# Patient Record
Sex: FEMALE | Race: WHITE
[De-identification: names, ages, dates, MRNs, and addresses within clinical notes are randomized per-mention and may not be internally consistent; named-entity substitution may affect disease eponyms.]

---

## 2017-09-05 ENCOUNTER — HOSPITAL ENCOUNTER (OUTPATIENT)
Dept: HOSPITAL 61 - PCVCCLINIC | Age: 67
Discharge: HOME | End: 2017-09-05
Attending: INTERNAL MEDICINE
Payer: MEDICARE

## 2017-09-05 DIAGNOSIS — E78.00: ICD-10-CM

## 2017-09-05 DIAGNOSIS — I25.10: Primary | ICD-10-CM

## 2017-09-05 DIAGNOSIS — Z96.653: ICD-10-CM

## 2017-09-05 DIAGNOSIS — Z87.891: ICD-10-CM

## 2017-09-05 DIAGNOSIS — I10: ICD-10-CM

## 2017-09-05 DIAGNOSIS — Z96.642: ICD-10-CM

## 2017-09-05 DIAGNOSIS — I45.89: ICD-10-CM

## 2017-09-05 DIAGNOSIS — Z90.49: ICD-10-CM

## 2017-09-05 DIAGNOSIS — M19.90: ICD-10-CM

## 2017-09-05 DIAGNOSIS — Z79.82: ICD-10-CM

## 2017-09-05 PROCEDURE — 36415 COLL VENOUS BLD VENIPUNCTURE: CPT

## 2017-09-05 PROCEDURE — G0463 HOSPITAL OUTPT CLINIC VISIT: HCPCS

## 2017-09-05 PROCEDURE — 93005 ELECTROCARDIOGRAM TRACING: CPT

## 2017-09-07 ENCOUNTER — HOSPITAL ENCOUNTER (OUTPATIENT)
Dept: HOSPITAL 61 - PCVCIMAG | Age: 67
Discharge: HOME | End: 2017-09-07
Attending: INTERNAL MEDICINE
Payer: MEDICARE

## 2017-09-07 DIAGNOSIS — Z01.810: Primary | ICD-10-CM

## 2017-09-07 DIAGNOSIS — E78.5: ICD-10-CM

## 2017-09-07 DIAGNOSIS — I10: ICD-10-CM

## 2017-09-07 DIAGNOSIS — I25.10: ICD-10-CM

## 2017-09-07 DIAGNOSIS — I07.1: ICD-10-CM

## 2017-09-07 PROCEDURE — 93306 TTE W/DOPPLER COMPLETE: CPT

## 2017-09-07 NOTE — PCVCIMAG
--------------- APPROVED REPORT --------------





Study performed:  2017 14:39:26



EXAM: Comprehensive 2D, Doppler, and color-flow 

Echocardiogram

Patient Location: Echo lab

Status:  routine



BSA:         2.17

HR: 70 bpmBP:          136/86 mmHg

Rhythm: NSR



Risk Factors: 

Cardiac Risk Factors:  HTN, 

Hyperlipidemia



Indications

Pre-Op

Dyspnea 

CAD



2D Dimensions

LVEF(%):  47.44 (&gt;50%)

IVSd:  7.23 (7-11mm)LVOT Diam:  25.64 (18-24mm) 

LVDd:  51.37 mm

PWd:  7.11 (7-11mm)Ascending Ao:  32.82 (22-36mm)

LVDs:  39.07 (25-40mm)

Left Atrium:  29.88 (27-40mm)

Aortic Root:  29.12 mm

LV Single Plane 4CH:  52.79 %

LV Single Plane 2CH:  53.63 %[facility]'s LVEF:  53.21 %

Biplane EF:  53.6 %



Volumes

Left Atrial Volume (Systole)

Single Plane 4CH:  27.33 mLSingle Plane 2CH:  48.50 mL

Biplane LA Volume:  38.00 mLLA ESV Index:  18.00 mL/m2



Aortic Valve

AoV Peak Scott.:  1.26 m/s

AO Peak Gr.:  6.36 mmHgLVOT Max P.18 mmHg

LVOT Max V:  1.14 m/s

BERTHA Vmax: 4.66 cm2



Mitral Valve

E/A Ratio:  0.7

MV Decel. Time:  205.53 ms

MV E Max Scott.:  0.54 m/s

MV A Scott.:  0.79 m/s

IVRT:  83.04 ms



TDI

E/Lateral E':  13.50E/Medial E':  6.00

Medial E' Scott.:  0.09 m/s

Lateral E' Scott.:  0.04 m/s



Pulmonary Valve

PV Peak Scott.:  0.72 m/sPV Peak Gr.:  2.09 mmHg



Pulmonary Vein

P Vein S:    0.50 m/sP Vein A:  0.30 m/s

P Vein D:   0.47 m/sP Vein A Dur.:  72.7 msec

P Vein S/D Ratio:  1.06



Tricuspid Valve

TR Peak Scott.:  1.22 m/s

TR Peak Gr.:  5.94 mmHg

TV Vmax:  0.49 m/sPA Pressure:  13.00 mmHg



Left Ventricle

The left ventricle is normal size. There is normal LV segmental wall 

motion. There is normal left ventricular wall thickness. Left 

ventricular systolic function is normal. The left ventricular 

ejection fraction is within the normal range. LVEF is 50-55%. Grade I 

- abnormal relaxation pattern.



Right Ventricle

The right ventricle is normal size. The right ventricular systolic 

function is normal.



Atria

The left atrium size is normal. The right atrium size is 

normal.



Aortic Valve

The aortic valve is normal in structure. No aortic regurgitation is 

present. There is no aortic valvular stenosis.



Mitral Valve

The mitral valve is normal in structure. There is no mitral valve 

regurgitation noted. No evidence of mitral valve stenosis.



Tricuspid Valve

The tricuspid valve is normal in structure. Trace tricuspid 

regurgitation with a PA pressure of 13 mmHg.



Pulmonic Valve

The pulmonary valve is normal in structure. There is no pulmonic 

valvular regurgitation.



Great Vessels

Aortic root is mildly dilated without aortic regurgitation. The 

ascending aorta is normal in size. IVC is normal in size and 

collapses &gt;50% with inspiration.



Pericardium

There is no pericardial effusion. There is no pleural 

effusion.



&lt;Conclusion&gt;

The left ventricle is normal size.

Left ventricular systolic function is normal.

Grade I - abnormal relaxation pattern.

The right ventricle is normal size.

The left atrium size is normal.

There is no aortic valvular stenosis.

The mitral valve is normal in structure.

Trace tricuspid regurgitation with a PA pressure of 13 mmHg.

## 2018-07-11 ENCOUNTER — HOSPITAL ENCOUNTER (OUTPATIENT)
Dept: HOSPITAL 61 - PCVCCLINIC | Age: 68
Discharge: HOME | End: 2018-07-11
Attending: INTERNAL MEDICINE
Payer: MEDICARE

## 2018-07-11 DIAGNOSIS — I25.10: Primary | ICD-10-CM

## 2018-07-11 DIAGNOSIS — I10: ICD-10-CM

## 2018-07-11 DIAGNOSIS — Z79.82: ICD-10-CM

## 2018-07-11 DIAGNOSIS — E78.00: ICD-10-CM

## 2018-07-11 DIAGNOSIS — Z87.891: ICD-10-CM

## 2018-07-11 DIAGNOSIS — Z79.899: ICD-10-CM

## 2018-07-11 PROCEDURE — 93005 ELECTROCARDIOGRAM TRACING: CPT

## 2018-07-11 PROCEDURE — 36415 COLL VENOUS BLD VENIPUNCTURE: CPT

## 2019-07-31 ENCOUNTER — HOSPITAL ENCOUNTER (OUTPATIENT)
Dept: HOSPITAL 61 - PCVCCLINIC | Age: 69
Discharge: HOME | End: 2019-07-31
Attending: INTERNAL MEDICINE
Payer: MEDICARE

## 2019-07-31 DIAGNOSIS — E78.5: ICD-10-CM

## 2019-07-31 DIAGNOSIS — Z79.82: ICD-10-CM

## 2019-07-31 DIAGNOSIS — R60.9: ICD-10-CM

## 2019-07-31 DIAGNOSIS — I25.10: Primary | ICD-10-CM

## 2019-07-31 DIAGNOSIS — I10: ICD-10-CM

## 2019-07-31 DIAGNOSIS — E78.00: ICD-10-CM

## 2019-07-31 DIAGNOSIS — M19.90: ICD-10-CM

## 2019-07-31 DIAGNOSIS — Z87.891: ICD-10-CM

## 2019-07-31 PROCEDURE — G0463 HOSPITAL OUTPT CLINIC VISIT: HCPCS

## 2019-07-31 PROCEDURE — 93005 ELECTROCARDIOGRAM TRACING: CPT
